# Patient Record
Sex: FEMALE | Race: WHITE | Employment: OTHER | URBAN - METROPOLITAN AREA
[De-identification: names, ages, dates, MRNs, and addresses within clinical notes are randomized per-mention and may not be internally consistent; named-entity substitution may affect disease eponyms.]

---

## 2018-05-08 ENCOUNTER — APPOINTMENT (OUTPATIENT)
Dept: CT IMAGING | Age: 68
End: 2018-05-08
Payer: MEDICARE

## 2018-05-08 ENCOUNTER — APPOINTMENT (OUTPATIENT)
Dept: GENERAL RADIOLOGY | Age: 68
End: 2018-05-08
Payer: MEDICARE

## 2018-05-08 ENCOUNTER — HOSPITAL ENCOUNTER (EMERGENCY)
Age: 68
Discharge: HOME | End: 2018-05-08
Attending: EMERGENCY MEDICINE
Payer: MEDICARE

## 2018-05-08 ENCOUNTER — APPOINTMENT (OUTPATIENT)
Dept: CT IMAGING | Age: 68
End: 2018-05-08
Attending: EMERGENCY MEDICINE
Payer: MEDICARE

## 2018-05-08 VITALS
SYSTOLIC BLOOD PRESSURE: 122 MMHG | WEIGHT: 135 LBS | OXYGEN SATURATION: 100 % | BODY MASS INDEX: 23.05 KG/M2 | TEMPERATURE: 97.4 F | HEIGHT: 64 IN | RESPIRATION RATE: 18 BRPM | DIASTOLIC BLOOD PRESSURE: 76 MMHG | HEART RATE: 65 BPM

## 2018-05-08 DIAGNOSIS — S06.5XAA SUBDURAL HEMATOMA: ICD-10-CM
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
## 2018-05-08 LAB
ALBUMIN SERPL-MCNC: 3.4 G/DL (ref 3.2–4.6)
ALBUMIN/GLOB SERPL: 0.8 {RATIO} (ref 1.2–3.5)
ALP SERPL-CCNC: 80 U/L (ref 50–136)
ALT SERPL-CCNC: 23 U/L (ref 12–65)
ANION GAP SERPL CALC-SCNC: 8 MMOL/L (ref 7–16)
AST SERPL-CCNC: 27 U/L (ref 15–37)
BASOPHILS # BLD: 0.1 K/UL (ref 0–0.2)
BASOPHILS NFR BLD: 1 % (ref 0–2)
BILIRUB SERPL-MCNC: 0.3 MG/DL (ref 0.2–1.1)
BUN SERPL-MCNC: 24 MG/DL (ref 8–23)
CALCIUM SERPL-MCNC: 8.8 MG/DL (ref 8.3–10.4)
CHLORIDE SERPL-SCNC: 101 MMOL/L (ref 98–107)
CO2 SERPL-SCNC: 30 MMOL/L (ref 21–32)
CREAT SERPL-MCNC: 0.78 MG/DL (ref 0.6–1)
DIFFERENTIAL METHOD BLD: NORMAL
EOSINOPHIL # BLD: 0.1 K/UL (ref 0–0.8)
EOSINOPHIL NFR BLD: 2 % (ref 0.5–7.8)
ERYTHROCYTE [DISTWIDTH] IN BLOOD BY AUTOMATED COUNT: 12.8 % (ref 11.9–14.6)
GLOBULIN SER CALC-MCNC: 4.1 G/DL (ref 2.3–3.5)
GLUCOSE SERPL-MCNC: 99 MG/DL (ref 65–100)
HCT VFR BLD AUTO: 39.9 % (ref 35.8–46.3)
HGB BLD-MCNC: 13.3 G/DL (ref 11.7–15.4)
IMM GRANULOCYTES # BLD: 0 K/UL (ref 0–0.5)
IMM GRANULOCYTES NFR BLD AUTO: 0 % (ref 0–5)
INR PPP: 0.9
LYMPHOCYTES # BLD: 1.7 K/UL (ref 0.5–4.6)
LYMPHOCYTES NFR BLD: 28 % (ref 13–44)
MCH RBC QN AUTO: 30 PG (ref 26.1–32.9)
MCHC RBC AUTO-ENTMCNC: 33.3 G/DL (ref 31.4–35)
MCV RBC AUTO: 89.9 FL (ref 79.6–97.8)
MONOCYTES # BLD: 0.6 K/UL (ref 0.1–1.3)
MONOCYTES NFR BLD: 10 % (ref 4–12)
NEUTS SEG # BLD: 3.7 K/UL (ref 1.7–8.2)
NEUTS SEG NFR BLD: 59 % (ref 43–78)
PLATELET # BLD AUTO: 176 K/UL (ref 150–450)
PMV BLD AUTO: 11.5 FL (ref 10.8–14.1)
POTASSIUM SERPL-SCNC: 3.4 MMOL/L (ref 3.5–5.1)
PROT SERPL-MCNC: 7.5 G/DL (ref 6.3–8.2)
PROTHROMBIN TIME: 12.5 SEC (ref 11.5–14.5)
RBC # BLD AUTO: 4.44 M/UL (ref 4.05–5.25)
SODIUM SERPL-SCNC: 139 MMOL/L (ref 136–145)
WBC # BLD AUTO: 6.2 K/UL (ref 4.3–11.1)

## 2018-05-08 PROCEDURE — 70450 CT HEAD/BRAIN W/O DYE: CPT

## 2018-05-08 PROCEDURE — 93005 ELECTROCARDIOGRAM TRACING: CPT | Performed by: PHYSICIAN ASSISTANT

## 2018-05-08 PROCEDURE — 85025 COMPLETE CBC W/AUTO DIFF WBC: CPT | Performed by: PHYSICIAN ASSISTANT

## 2018-05-08 PROCEDURE — 85610 PROTHROMBIN TIME: CPT | Performed by: PHYSICIAN ASSISTANT

## 2018-05-08 PROCEDURE — 71046 X-RAY EXAM CHEST 2 VIEWS: CPT

## 2018-05-08 PROCEDURE — 99285 EMERGENCY DEPT VISIT HI MDM: CPT | Performed by: PHYSICIAN ASSISTANT

## 2018-05-08 PROCEDURE — 80053 COMPREHEN METABOLIC PANEL: CPT | Performed by: PHYSICIAN ASSISTANT

## 2018-05-08 RX ORDER — ASPIRIN 81 MG/1
81 TABLET ORAL DAILY
COMMUNITY

## 2018-05-08 RX ORDER — VALSARTAN AND HYDROCHLOROTHIAZIDE 160; 25 MG/1; MG/1
1 TABLET ORAL DAILY
COMMUNITY

## 2018-05-08 NOTE — ED TRIAGE NOTES
Patient reports falling backwards at hit head. Unknown LOC. Reports seeing \"triple\" immediately after it happened. Visual changes since has disappeared.

## 2018-05-08 NOTE — ED PROVIDER NOTES
HPI Comments: Patient was playing with her grandson who was on a blanket and she stepped backwards tripping on the blanket subsequently falling backwards hitting the back of her head on the wall. She thinks she may have had brief loss of consciousness. She did have double vision and some nausea but no vomiting. She currently has no visual changes, nausea, vomiting, dizziness, weakness, neck pain, chest pain, shortness of breath, abdominal pain or other symptoms. She is here with her  who is driving her today. She did take some Tylenol at home for the pain and has been fine since then. She does have a history of hypertension. She was ambulatory to the room without difficulty and well-hydrated. Patient is a 78 y.o. female presenting with fall. The history is provided by the patient. Fall   The accident occurred 1 to 2 hours ago. The fall occurred while walking. Distance fallen: fell backwards after tripping, into a wall. The point of impact was the head. The pain is present in the head. The pain is at a severity of 1/10. The pain is mild. She was ambulatory at the scene. There was no entrapment after the fall. There was no drug use involved in the accident. There was no alcohol use involved in the accident. Associated symptoms include visual change, nausea and loss of consciousness (Very brief and questionable). Pertinent negatives include no fever, no numbness, no abdominal pain, no bowel incontinence, no vomiting, no hematuria, no headaches, no extremity weakness, no hearing loss, no tingling and no laceration. She has tried acetaminophen for the symptoms. The treatment provided moderate relief. No past medical history on file. No past surgical history on file. No family history on file. Social History     Social History    Marital status:      Spouse name: N/A    Number of children: N/A    Years of education: N/A     Occupational History    Not on file.      Social History Main Topics    Smoking status: Not on file    Smokeless tobacco: Not on file    Alcohol use Not on file    Drug use: Not on file    Sexual activity: Not on file     Other Topics Concern    Not on file     Social History Narrative         ALLERGIES: Review of patient's allergies indicates no known allergies. Review of Systems   Constitutional: Negative. Negative for fever. HENT: Negative. Eyes: Negative. Respiratory: Negative. Cardiovascular: Negative. Gastrointestinal: Positive for nausea. Negative for abdominal pain, bowel incontinence and vomiting. Genitourinary: Negative. Negative for hematuria. Musculoskeletal: Negative. Negative for extremity weakness. Skin: Negative. Neurological: Positive for loss of consciousness (Very brief and questionable). Negative for tingling, numbness and headaches. Psychiatric/Behavioral: Negative. All other systems reviewed and are negative. Vitals:    05/08/18 1121   BP: 142/80   Pulse: (!) 56   Resp: 18   Temp: 97.4 °F (36.3 °C)   SpO2: 100%   Weight: 61.2 kg (135 lb)   Height: 5' 4\" (1.626 m)            Physical Exam   Constitutional: She is oriented to person, place, and time. She appears well-developed and well-nourished. HENT:   Head: Normocephalic and atraumatic. Right Ear: External ear normal.   Left Ear: External ear normal.   Nose: Nose normal.   Mouth/Throat: Oropharynx is clear and moist.   Eyes: Conjunctivae and EOM are normal. Pupils are equal, round, and reactive to light. Neck: Normal range of motion. Neck supple. Cardiovascular: Normal rate, regular rhythm, normal heart sounds and intact distal pulses. Pulmonary/Chest: Effort normal and breath sounds normal.   Abdominal: Soft. Bowel sounds are normal.   Musculoskeletal: Normal range of motion. Neurological: She is alert and oriented to person, place, and time. She has normal strength and normal reflexes. No cranial nerve deficit or sensory deficit. She displays a negative Romberg sign. GCS eye subscore is 4. GCS verbal subscore is 5. GCS motor subscore is 6. Reflex Scores:       Tricep reflexes are 2+ on the right side and 2+ on the left side. Bicep reflexes are 2+ on the right side and 2+ on the left side. Brachioradialis reflexes are 2+ on the right side and 2+ on the left side. Patellar reflexes are 2+ on the right side and 2+ on the left side. Achilles reflexes are 2+ on the right side and 2+ on the left side. Skin: Skin is warm and dry. No laceration noted. Psychiatric: She has a normal mood and affect. Her behavior is normal. Judgment and thought content normal.   Nursing note and vitals reviewed. MDM  Number of Diagnoses or Management Options  Fall, initial encounter: new and requires workup  Subdural hematoma ([facility] [address]): new and requires workup     Amount and/or Complexity of Data Reviewed  Clinical lab tests: ordered and reviewed  Tests in the radiology section of CPT®: ordered and reviewed    Risk of Complications, Morbidity, and/or Mortality  Presenting problems: moderate  Diagnostic procedures: moderate  Management options: moderate    Patient Progress  Patient progress: stable        ED Course       Procedures    12:07 PM Spoke with Dr. Kacy Barriga regarding patient, he took a call from radiology, ? Subdural hemtoma. I have ordered labs, paged neurosurgery and informed the patient. She is stable right now and states \"my head feels heavy but no pain. \" 12:24 PM Spoke with Dr. Mo Junior regarding patient. He would like the CT head repeated in 48 hours. He reviewed the CT of the head and thinks it is a subtle finding. Informed Dr. Kacy Barriga of that as well. We are awaiting blood work. 2:33 PM Spoke with Dr. Reza Singh, he would like Dr. Kacy Barriga to evaluate patient and decide if he would like admission.  Dr. Kacy Barriga saw patient and wanted head CT repeated at [id number], if the bleed was stable or worse, he wanted patient admitted, if it was artifact, she could go home. 18:23 pm hospitalist paged. 6:34 PM Hospitalist paged again. 6:38 PM Dr. Jose Balderas returned call, he will admit patient. Patient is stable.

## 2018-05-08 NOTE — CONSULTS
Hospitalist Consult Note     Admit Date:  2018 11:53 AM   Name:  Gerardo Lam   Age:  78 y.o.  :  1950   MRN:  [id number]   PCP:  Not On File [facility]  Treatment Team: Physician Assistant: GIOVANNI Iglesias; Primary Nurse: Wally Pablo RN    HPI:   Pt is a 79 y/o F who presented after fall at home. She fell backwards and hit her head on the wall. [place] is from [place] with  visiting son. She was dazed after the fall and \"head felt heavy\". Her  brought her to the ER. Currently no HA, n/v, vision changes, focal deficits; says she feels fine and wants to go home. 10 systems reviewed and negative except as noted in HPI. PMH - HTN  No PSH. No current facility-administered medications for this encounter. Current Outpatient Prescriptions   Medication Sig    aspirin delayed-release 81 mg tablet Take 81 mg by mouth daily.  valsartan-hydroCHLOROthiazide (DIOVAN HCT) 160-25 mg per tablet Take 1 Tab by mouth daily. No Known Allergies   Social History   Substance Use Topics    Smoking status: Not on file    Smokeless tobacco: Not on file    Alcohol use Not on file      family history reviewed, not pertinent    There is no immunization history on file for this patient.     Objective:   Patient Vitals for the past 24 hrs:   Temp Pulse Resp BP SpO2   18 191 - (!) 55 16 113/68 99 %   18 1800 - (!) 53 16 117/67 99 %   18 1700 - (!) 55 16 115/69 99 %   18 1612 - (!) 52 16 113/69 99 %   18 1506 - (!) 53 16 118/71 98 %   18 1439 - 65 - - 99 %   18 1407 - (!) 57 16 122/72 100 %   18 1315 - (!) 53 16 115/74 99 %   18 1229 - (!) 55 16 121/74 100 %   18 1121 97.4 °F (36.3 °C) (!) 56 18 142/80 100 %     Oxygen Therapy  O2 Sat (%): 99 % (05/08/18 1915)  Pulse via Oximetry: 53 beats per minute (18 1612)  O2 Device: Room air (18 1121)  No intake or output data in the 24 hours ending 05/08/18 2000    Physical Exam:  General:    Well nourished. Alert. Eyes:   Normal sclera. PERRL. Extraocular movements intact. ENT:  Normocephalic, atraumatic. Moist mucous membranes  Extremities: Warm and dry. No cyanosis or edema. Neurologic: CN II-XII grossly intact. Sensation intact. Skin:     No rashes or jaundice. Psych:  Normal mood and affect. I reviewed the labs, imaging, EKGs, telemetry, and other studies done this admission. Data Review:   Recent Results (from the past 24 hour(s))   CBC WITH AUTOMATED DIFF    Collection Time: 05/08/18 12:40 PM   Result Value Ref Range    WBC 6.2 4.3 - 11.1 K/uL    RBC 4.44 4.05 - 5.25 M/uL    HGB 13.3 11.7 - 15.4 g/dL    HCT 39.9 35.8 - 46.3 %    MCV 89.9 79.6 - 97.8 FL    MCH 30.0 26.1 - 32.9 PG    MCHC 33.3 31.4 - 35.0 g/dL    RDW 12.8 11.9 - 14.6 %    PLATELET 129 397 - 669 K/uL    MPV 11.5 10.8 - 14.1 FL    DF AUTOMATED      NEUTROPHILS 59 43 - 78 %    LYMPHOCYTES 28 13 - 44 %    MONOCYTES 10 4.0 - 12.0 %    EOSINOPHILS 2 0.5 - 7.8 %    BASOPHILS 1 0.0 - 2.0 %    IMMATURE GRANULOCYTES 0 0.0 - 5.0 %    ABS. NEUTROPHILS 3.7 1.7 - 8.2 K/UL    ABS. LYMPHOCYTES 1.7 0.5 - 4.6 K/UL    ABS. MONOCYTES 0.6 0.1 - 1.3 K/UL    ABS. EOSINOPHILS 0.1 0.0 - 0.8 K/UL    ABS. BASOPHILS 0.1 0.0 - 0.2 K/UL    ABS. IMM. GRANS. 0.0 0.0 - 0.5 K/UL   METABOLIC PANEL, COMPREHENSIVE    Collection Time: 05/08/18 12:40 PM   Result Value Ref Range    Sodium 139 136 - 145 mmol/L    Potassium 3.4 (L) 3.5 - 5.1 mmol/L    Chloride 101 98 - 107 mmol/L    CO2 30 21 - 32 mmol/L    Anion gap 8 7 - 16 mmol/L    Glucose 99 65 - 100 mg/dL    BUN 24 (H) 8 - 23 MG/DL    Creatinine 0.78 0.6 - 1.0 MG/DL    GFR est AA >60 >60 ml/min/1.73m2    GFR est non-AA >60 >60 ml/min/1.73m2    Calcium 8.8 8.3 - 10.4 MG/DL    Bilirubin, total 0.3 0.2 - 1.1 MG/DL    ALT (SGPT) 23 12 - 65 U/L    AST (SGOT) 27 15 - 37 U/L    Alk.  phosphatase 80 50 - 136 U/L    Protein, total 7.5 6.3 - 8.2 g/dL Albumin 3.4 3.2 - 4.6 g/dL    Globulin 4.1 (H) 2.3 - 3.5 g/dL    A-G Ratio 0.8 (L) 1.2 - 3.5     PROTHROMBIN TIME + INR    Collection Time: 05/08/18 12:40 PM   Result Value Ref Range    Prothrombin time 12.5 11.5 - 14.5 sec    INR 0.9         All Micro Results     None          Other Studies:  Xr Chest Pa Lat    Result Date: 5/8/2018  Chest X-ray INDICATION:  Fall, subdural hemorrhage PA and lateral views of the chest were obtained. FINDINGS: The lungs are clear. There are no infiltrates or effusions. The heart size is normal.  The bony thorax is intact. IMPRESSION: No acute findings in the chest     Ct Head Wo Cont    Result Date: 5/8/2018  Noncontrast CT of the brain. Comparison: Earlier today Indication: Fall, follow-up subdural hematoma Technique: Contiguous axial images were obtained from the skull base through the vertex without IV contrast. Radiation dose reduction techniques were used for this study:  Our CT scanners use one or all of the following: Automated exposure control, adjustment of the mA and/or kVp according to patient's size, iterative reconstruction. Findings: A few punctate foci of density along the midline falx are stable in appearance. No progressive hematoma or interval evidence of acute cortical-based infarction. Ventricles stable in size. No herniation. No midline shift. Impression: 1. Stable appearance of subarachnoid and/or subdural punctate hematomas along the midline falx. Ct Head Wo Cont    Result Date: 5/8/2018  CT head without contrast: 05/08/2018 History: fall, unknown LOC. Technique: 5mm axial images were obtained from the skull base to the vertex without intravenous contrast.  Radiation dose reduction techniques were used for this study:  Our CT scanners use one or all of the following: Automated exposure control, adjustment of the mA and/or kVp according to patient's size, iterative reconstruction.  Comparison: None Findings: The ventricles and sulci are normal in size and configuration. There are subtle foci of increased density at the bifrontal interhemispheric region best seen on images 17 and 18. These could represent trace amounts of subdural or subarachnoid blood products. There is no midline shift. There is no evidence to suggest an acute major territorial infarct. There is no evidence of acute intraparenchymal hemorrhage or mass effect. The bony calvarium is intact. The visualized mastoid air cells and paranasal sinuses are well pneumatized and aerated. Impression: Possible small amounts of subarachnoid and/or subdural blood products along the along the falx of the frontal regions. [facility] The critical results contained in this report were communicated to Dr. Warner Marquez by myself at 12:06 PM on 05/08/2018. Critical results were communicated as outlined in Section II.C.2.a.i of the ACR Practice Guideline for Communication of Diagnostic Imaging Findings. Assessment and Plan:       PLAN:  Pt seen and examined. CT head personally reviewed. Pt had fall while visiting from out of state, she and  staying with son. Pt denies vision changes, n/v, focal deficits, trouble walking, or other symptoms. She even denies HA at this time. She says she feels fine. Apparently Dr Oc Gonzalez the neurosurgeon had already spoken with providers earlier in the evening and said pt could go home with follow up CT head in 48 hours. I spoke with Neurosurgeon Dr Charleen Serrano who is on call and he feels as long as she has someone with her, which she does, she can go home; given stability on repeat likelihood of active bleeding is low.  said he will keep a close eye on her. If she has any new symptoms at all she can come back immediately. He said she can call the office in the morning and they will set up repeat CT head and follow up. Pt agrees with this plan and does not want to be admitted. I have no objections.   I have told pt to not take the baby aspirin until OK with neurosurgery. She also understands she should not drive or operate machinery. BP well controlled. Have informed nursing that she can be discharged from ER; she needs contact info for the Neurosurgery office to call in the morning. Pt/family appreciative and happy to be leaving.     Signed:  Fili Villavicencio MD

## 2018-05-09 LAB
ATRIAL RATE: 50 BPM
CALCULATED P AXIS, ECG09: 44 DEGREES
CALCULATED R AXIS, ECG10: 19 DEGREES
CALCULATED T AXIS, ECG11: 20 DEGREES
DIAGNOSIS, 93000: NORMAL
P-R INTERVAL, ECG05: 134 MS
Q-T INTERVAL, ECG07: 458 MS
QRS DURATION, ECG06: 88 MS
QTC CALCULATION (BEZET), ECG08: 417 MS
VENTRICULAR RATE, ECG03: 50 BPM

## 2018-05-09 NOTE — ED NOTES
Patient has been seen by hospitalistDr. Bonnie Reilly - who feels as though patient may be discharged. Prior mid-level head discussed with Dr. Radha Chavis who is on for neurosurgery who felt the patient could have a repeat CT scan in 48 hours with an understanding to return with worsening. I might contact the patient and patient is very alert and interactive pleasant and appropriate. She has excellent support systems and feel as though patient may be followed up as an outpatient as well.   We'll provide her with Dr. Zakiya Herrera name and discharge instructions

## 2018-05-09 NOTE — DISCHARGE INSTRUCTIONS
Subdural Hematoma: Care Instructions  Your Care Instructions  A subdural hematoma is a buildup of blood between the layers of tissue that cover the brain. The blood collects under the layer closest to the skull. (This layer is called the dura.) The bleeding is most often caused by a head injury, but there can be other causes. In an older adult, even a minor injury can lead to a subdural hematoma. The buildup of blood inside the skull can put pressure on the brain. This may cause symptoms, such as a severe headache, confusion, or seizures. There are two kinds of hematomas: acute and chronic. · With an acute hematoma, symptoms start soon after the injury. · With a chronic hematoma, it may be days or weeks before symptoms appear. Doctors use imaging tests to find the buildup of blood. You may have a test such as a CT scan or MRI. The doctor may also do a test to check the pressure inside your skull. Bleeding inside the skull may get worse over time. So it is very important to pay attention to your symptoms. And be sure to see your doctor for follow-up testing. In some cases, treatment is needed to remove the blood. This helps relieve the pressure on the brain. Your doctor may make one or two small holes in your skull. For a large hematoma, the doctor may need to remove a piece of the skull. You may not need treatment if you have a small hematoma that is not causing symptoms. If you take aspirin or some other blood thinner, you may need to stop taking it. The doctor may give you treatment to undo the effects of the blood thinner. This can help prevent more bleeding in the skull. The doctor has checked you carefully, but problems can develop later. If you notice any problems or new symptoms, get medical treatment right away. Follow-up care is a key part of your treatment and safety. Be sure to make and go to all appointments, and call your doctor if you are having problems.  It's also a good idea to know your test results and keep a list of the medicines you take. How can you care for yourself at home? For an acute hematoma  · Follow your doctor's instructions. He or she will tell you if you need someone to watch you closely for the next 24 hours or longer. For a chronic hematoma  · Get plenty of sleep at night, and take it easy during the day. Rest is the best way to recover. · Avoid activities that are physically or mentally demanding. These include housework, exercise, paperwork, video games, text messaging, and using the computer. You may need to change your work or school schedule for a while. · Return to your normal activities slowly. Do not try to do too much at once. For either type of hematoma  · Do not drink alcohol until your doctor says it is okay. · Don't drive a car, ride a bike, or operate machinery until your doctor says it's okay. · If you take aspirin or some other blood thinner, be sure to talk to your doctor. He or she will tell you if and when to start taking this medicine again. Make sure that you understand exactly what your doctor wants you to do. · If you normally take medicine, your doctor will tell you if and when you can restart it. He or she will also give you instructions about taking any new medicines. When should you call for help? Call 911 anytime you think you may need emergency care. For example, call if:  ? · You have a seizure. ? · You passed out (lost consciousness). ? · You are confused or can't stay awake. ?Call your doctor now or seek immediate medical care if:  ? · You have new or worse vomiting. ? · You feel less alert. ? · You have new weakness or numbness in any part of your body. ? · You have a headache that is getting worse. ? Watch closely for changes in your health, and be sure to contact your doctor if:  ? · You do not get better as expected. ? · You have new symptoms, such as headaches, trouble concentrating, or changes in mood.    Where can you learn more? Go to http://sujata-adamaris.info/. Enter V637 in the search box to learn more about \"Subdural Hematoma: Care Instructions. \"  Current as of: October 14, 2016  Content Version: 11.4  © 0345-4707 [employer]. Care instructions adapted under license by [employer] (which disclaims liability or warranty for this information). If you have questions about a medical condition or this instruction, always ask your healthcare professional. [address] any warranty or liability for your use of this information. Head Injury: Care Instructions  Your Care Instructions    Most injuries to the head are minor. Bumps, cuts, and scrapes on the head and face usually heal well and can be treated the same as injuries to other parts of the body. Although it's rare, once in a while a more serious problem shows up after you are home. So it's good to be on the lookout for symptoms for a day or two. Follow-up care is a key part of your treatment and safety. Be sure to make and go to all appointments, and call your doctor if you are having problems. It's also a good idea to know your test results and keep a list of the medicines you take. How can you care for yourself at home? · Follow your doctor's instructions. He or she will tell you if you need someone to watch you closely for the next 24 hours or longer. · Take it easy for the next few days or more if you are not feeling well. · Ask your doctor when it's okay for you to go back to activities like driving a car, riding a bike, or operating machinery. When should you call for help? Call 911 anytime you think you may need emergency care. For example, call if:  ? · You have a seizure. ? · You passed out (lost consciousness). ? · You are confused or can't stay awake. ?Call your doctor now or seek immediate medical care if:  ? · You have new or worse vomiting. ? · You feel less alert.    ? · You have new weakness or numbness in any part of your body. ? Watch closely for changes in your health, and be sure to contact your doctor if:  ? · You do not get better as expected. ? · You have new symptoms, such as headaches, trouble concentrating, or changes in mood. Where can you learn more? Go to http://sujata-adamaris.info/. Enter N166 in the search box to learn more about \"Head Injury: Care Instructions. \"  Current as of: October 14, 2016  Content Version: 11.4  © 7982-0561 [employer]. Care instructions adapted under license by [employer] (which disclaims liability or warranty for this information). If you have questions about a medical condition or this instruction, always ask your healthcare professional. [address] any warranty or liability for your use of this information.

## 2018-05-09 NOTE — ED NOTES
I have reviewed discharge instructions with the patient. The patient verbalized understanding. Patient left ED via Discharge Method: ambulatory to Home with ). Opportunity for questions and clarification provided. Patient given 0 scripts. To continue your aftercare when you leave the hospital, you may receive an automated call from our care team to check in on how you are doing. This is a free service and part of our promise to provide the best care and service to meet your aftercare needs.  If you have questions, or wish to unsubscribe from this service please call [phone]. Thank you for Choosing our [facility] Emergency Department. Pt in no distress. Pt walked out of ER without difficulty.

## 2018-05-10 ENCOUNTER — HOSPITAL ENCOUNTER (OUTPATIENT)
Dept: CT IMAGING | Age: 68
Discharge: HOME | End: 2018-05-10
Attending: NEUROLOGICAL SURGERY
Payer: MEDICARE

## 2018-05-10 DIAGNOSIS — S06.5XAA SUBDURAL HEMATOMA: ICD-10-CM

## 2018-05-10 PROCEDURE — 70450 CT HEAD/BRAIN W/O DYE: CPT

## 2019-01-07 PROBLEM — N84.0 ENDOMETRIAL POLYP: Status: ACTIVE | Noted: 2019-01-07
